# Patient Record
Sex: FEMALE | Race: BLACK OR AFRICAN AMERICAN | NOT HISPANIC OR LATINO | Employment: FULL TIME | URBAN - METROPOLITAN AREA
[De-identification: names, ages, dates, MRNs, and addresses within clinical notes are randomized per-mention and may not be internally consistent; named-entity substitution may affect disease eponyms.]

---

## 2024-01-10 ENCOUNTER — HOSPITAL ENCOUNTER (EMERGENCY)
Facility: HOSPITAL | Age: 24
Discharge: HOME | End: 2024-01-10
Attending: STUDENT IN AN ORGANIZED HEALTH CARE EDUCATION/TRAINING PROGRAM
Payer: COMMERCIAL

## 2024-01-10 VITALS
TEMPERATURE: 98 F | WEIGHT: 140 LBS | OXYGEN SATURATION: 100 % | RESPIRATION RATE: 19 BRPM | SYSTOLIC BLOOD PRESSURE: 125 MMHG | HEIGHT: 67 IN | DIASTOLIC BLOOD PRESSURE: 90 MMHG | HEART RATE: 94 BPM | BODY MASS INDEX: 21.97 KG/M2

## 2024-01-10 DIAGNOSIS — T78.40XA ALLERGIC REACTION, INITIAL ENCOUNTER: Primary | ICD-10-CM

## 2024-01-10 DIAGNOSIS — L30.9 DERMATITIS: ICD-10-CM

## 2024-01-10 LAB — B-HCG SERPL QL: NEGATIVE

## 2024-01-10 PROCEDURE — 81025 URINE PREGNANCY TEST: CPT | Performed by: NURSE PRACTITIONER

## 2024-01-10 PROCEDURE — 96372 THER/PROPH/DIAG INJ SC/IM: CPT | Performed by: NURSE PRACTITIONER

## 2024-01-10 PROCEDURE — 63600175 PHARM REV CODE 636 W HCPCS: Performed by: NURSE PRACTITIONER

## 2024-01-10 PROCEDURE — 99284 EMERGENCY DEPT VISIT MOD MDM: CPT

## 2024-01-10 PROCEDURE — 25000003 PHARM REV CODE 250: Performed by: NURSE PRACTITIONER

## 2024-01-10 RX ORDER — DEXAMETHASONE SODIUM PHOSPHATE 4 MG/ML
8 INJECTION, SOLUTION INTRA-ARTICULAR; INTRALESIONAL; INTRAMUSCULAR; INTRAVENOUS; SOFT TISSUE
Status: COMPLETED | OUTPATIENT
Start: 2024-01-10 | End: 2024-01-10

## 2024-01-10 RX ORDER — FAMOTIDINE 20 MG/1
20 TABLET, FILM COATED ORAL 2 TIMES DAILY
Qty: 20 TABLET | Refills: 0 | Status: SHIPPED | OUTPATIENT
Start: 2024-01-10 | End: 2024-01-20

## 2024-01-10 RX ORDER — TRIAMCINOLONE ACETONIDE 1 MG/G
CREAM TOPICAL 2 TIMES DAILY
Qty: 15 G | Refills: 1 | Status: SHIPPED | OUTPATIENT
Start: 2024-01-10 | End: 2024-01-20

## 2024-01-10 RX ORDER — DIPHENHYDRAMINE HYDROCHLORIDE 50 MG/ML
25 INJECTION, SOLUTION INTRAMUSCULAR; INTRAVENOUS
Status: COMPLETED | OUTPATIENT
Start: 2024-01-10 | End: 2024-01-10

## 2024-01-10 RX ORDER — FAMOTIDINE 20 MG/1
20 TABLET, FILM COATED ORAL
Status: COMPLETED | OUTPATIENT
Start: 2024-01-10 | End: 2024-01-10

## 2024-01-10 RX ORDER — PREDNISONE 10 MG/1
10 TABLET ORAL 2 TIMES DAILY
Qty: 10 TABLET | Refills: 0 | Status: SHIPPED | OUTPATIENT
Start: 2024-01-10 | End: 2024-01-15

## 2024-01-10 RX ADMIN — DEXAMETHASONE SODIUM PHOSPHATE 8 MG: 4 INJECTION, SOLUTION INTRA-ARTICULAR; INTRALESIONAL; INTRAMUSCULAR; INTRAVENOUS; SOFT TISSUE at 11:01

## 2024-01-10 RX ADMIN — FAMOTIDINE 20 MG: 20 TABLET, FILM COATED ORAL at 11:01

## 2024-01-10 RX ADMIN — DIPHENHYDRAMINE HYDROCHLORIDE 25 MG: 50 INJECTION INTRAMUSCULAR; INTRAVENOUS at 11:01

## 2024-01-10 NOTE — Clinical Note
"Celena "Janet Yao was seen and treated in our emergency department on 1/10/2024.  She may return to school on 01/15/2024.  Please excuse for up to 48hours prior for symptoms present at that time if possible. Patient may also return sooner than above date if symptoms improve/resolve.      If you have any questions or concerns, please don't hesitate to call.      Kassidy Lujan PA"

## 2024-01-10 NOTE — FIRST PROVIDER EVALUATION
Medical screening examination initiated.  I have conducted a focused provider triage encounter, findings are as follows:    Brief history of present illness:  Patient states itching and a rash after starting Amoxicillin and Clarithromycin.     There were no vitals filed for this visit.    Pertinent physical exam:  Awake, alert, ambulatory      Brief workup plan:  Medications    Preliminary workup initiated; this workup will be continued and followed by the physician or advanced practice provider that is assigned to the patient when roomed.

## 2024-01-10 NOTE — ED PROVIDER NOTES
Encounter Date: 1/10/2024       History     Chief Complaint   Patient presents with    Itching    Urticaria     Pt reports severe pruritus on entire body today. Reports itching/urticaria since taking abx (amoxiccillin and clarithomycin for h.pylori infx) last Thursday. Seen in an urgent care yesterday and was given methyl prednisone. Pt converse in full sentences, no tongue swelling, denies SOB     23-year-old  female presents to the emergency room with itching and hives diffusely spread across the body.  Patient was initially on amoxicillin and clarithromycin for approximately 6 days for H pylori prior to rash beginning.  Patient received steroid injection and steroids from local urgent care approximately 3-4 days ago.  Patient has not taken amoxicillin and clarithromycin since then.  Patient denies nausea, vomiting, fever, chills, chest pain, or difficulty breathing.  Patient denies any kind of tongue swelling or facial edema.  Patient wants to know how discontinuing this medication will affect her moving forward.    The history is provided by the patient. No  was used.     Review of patient's allergies indicates:   Allergen Reactions    Amoxicillin Hives    Clarithromycin Hives     History reviewed. No pertinent past medical history.  No past surgical history on file.  No family history on file.     Review of Systems   Constitutional:  Negative for appetite change, diaphoresis, fatigue and fever.   HENT: Negative.  Negative for facial swelling.    Eyes: Negative.    Respiratory:  Negative for cough, chest tightness and shortness of breath.    Cardiovascular: Negative.  Negative for chest pain.   Gastrointestinal: Negative.    Endocrine: Negative.    Genitourinary: Negative.    Allergic/Immunologic: Positive for environmental allergies (amoxil v clarithromycin).   Neurological: Negative.    Hematological: Negative.    Psychiatric/Behavioral: Negative.     All other systems  reviewed and are negative.      Physical Exam     Initial Vitals [01/10/24 1103]   BP Pulse Resp Temp SpO2   132/87 105 19 98.2 °F (36.8 °C) 96 %      MAP       --         Physical Exam    Vitals reviewed.  Constitutional: She appears well-developed and well-nourished. No distress.   HENT:   Head: Normocephalic and atraumatic.   Eyes: Conjunctivae, EOM and lids are normal. Pupils are equal, round, and reactive to light.   Neck: Neck supple.   Normal range of motion.  Cardiovascular:  Normal rate and regular rhythm.           Pulmonary/Chest: Effort normal and breath sounds normal.   Abdominal: Abdomen is soft and flat. Bowel sounds are normal.   Musculoskeletal:      Cervical back: Normal range of motion and neck supple.     Neurological: She is alert and oriented to person, place, and time. She has normal strength. She is not disoriented. No cranial nerve deficit or sensory deficit. GCS eye subscore is 4. GCS verbal subscore is 5. GCS motor subscore is 6.   Skin: Skin is warm and intact. Rash noted. Rash is urticarial (diffuse erythematous hives. no acute distress. no facial edema or swelling.).   Psychiatric: She has a normal mood and affect. Her speech is normal and behavior is normal. Judgment and thought content normal. Cognition and memory are normal.         ED Course   Procedures  Labs Reviewed   HCG QUALITATIVE URINE - Normal          Imaging Results    None          Medications   diphenhydrAMINE injection 25 mg (25 mg Intramuscular Given 1/10/24 1139)   famotidine tablet 20 mg (20 mg Oral Given 1/10/24 1139)   dexAMETHasone injection 8 mg (8 mg Intramuscular Given 1/10/24 1139)     Medical Decision Making  23-year-old  female presents to the emergency room with itching and hives diffusely spread across the body.  Patient was initially on amoxicillin and clarithromycin for approximately 6 days for H pylori prior to rash beginning.  Patient received steroid injection and steroids from local  urgent care approximately 3-4 days ago.  Patient has not taken amoxicillin and clarithromycin since then.  Patient denies nausea, vomiting, fever, chills, chest pain, or difficulty breathing.  Patient denies any kind of tongue swelling or facial edema.  Patient wants to know how discontinuing this medication will affect her moving forward.    Patient declined alternative treatment with Flagyl and doxycycline for H pylori.  She would like to just be treated for the rash and allergic reaction at this time.  She will follow up with her doctor in [place] for H pylori treatment and verification of H pylori eradication.    Problems Addressed:  Allergic reaction, initial encounter: acute illness or injury  Dermatitis: acute illness or injury    Risk  OTC drugs.  Prescription drug management.                                      Clinical Impression:  Final diagnoses:  [T78.40XA] Allergic reaction, initial encounter (Primary)  [L30.9] Dermatitis          ED Disposition Condition    Discharge Stable          ED Prescriptions       Medication Sig Dispense Start Date End Date Auth. Provider    predniSONE (DELTASONE) 10 MG tablet Take 1 tablet (10 mg total) by mouth 2 (two) times daily. Do not take with other oral steroid regimens. for 5 days 10 tablet 1/10/2024 1/15/2024 Kassidy Lujan PA    famotidine (PEPCID) 20 MG tablet Take 1 tablet (20 mg total) by mouth 2 (two) times daily. for 10 days 20 tablet 1/10/2024 1/20/2024 Kassidy Lujan PA    triamcinolone acetonide 0.1% (KENALOG) 0.1 % cream Apply topically 2 (two) times daily. As needed for itching for 10 days 15 g 1/10/2024 1/20/2024 Kassidy Lujan PA          Follow-up Information       Follow up With Specialties Details Why Contact Info    [facility] - Emergency Dept Emergency Medicine  As needed, If symptoms worsen [address] [address] [phone]    PCP in [place]  In 1 week for H. pylori eradicaton and  treatment alternatives              Le, FRANKLIN Richter  01/10/24 5091

## 2024-02-05 ENCOUNTER — HOSPITAL ENCOUNTER (EMERGENCY)
Facility: HOSPITAL | Age: 24
Discharge: HOME | End: 2024-02-05
Attending: STUDENT IN AN ORGANIZED HEALTH CARE EDUCATION/TRAINING PROGRAM
Payer: COMMERCIAL

## 2024-02-05 VITALS
BODY MASS INDEX: 21.97 KG/M2 | RESPIRATION RATE: 18 BRPM | WEIGHT: 140 LBS | SYSTOLIC BLOOD PRESSURE: 113 MMHG | OXYGEN SATURATION: 100 % | HEIGHT: 67 IN | HEART RATE: 88 BPM | TEMPERATURE: 98 F | DIASTOLIC BLOOD PRESSURE: 81 MMHG

## 2024-02-05 DIAGNOSIS — K64.8 INTERNAL HEMORRHOIDS: Primary | ICD-10-CM

## 2024-02-05 DIAGNOSIS — N39.0 URINARY TRACT INFECTION WITHOUT HEMATURIA, SITE UNSPECIFIED: ICD-10-CM

## 2024-02-05 LAB
ALBUMIN SERPL-MCNC: 4.1 G/DL (ref 3.5–5)
ALBUMIN/GLOB SERPL: 1.2 RATIO (ref 1.1–2)
ALP SERPL-CCNC: 55 UNIT/L (ref 40–150)
ALT SERPL-CCNC: 13 UNIT/L (ref 0–55)
APPEARANCE UR: CLEAR
AST SERPL-CCNC: 20 UNIT/L (ref 5–34)
B-HCG SERPL QL: NEGATIVE
BACTERIA #/AREA URNS AUTO: ABNORMAL /HPF
BASOPHILS # BLD AUTO: 0.03 X10(3)/MCL
BASOPHILS NFR BLD AUTO: 0.6 %
BILIRUB SERPL-MCNC: 1.2 MG/DL
BILIRUB UR QL STRIP.AUTO: NEGATIVE
BUN SERPL-MCNC: 8.2 MG/DL (ref 7–18.7)
CALCIUM SERPL-MCNC: 9.3 MG/DL (ref 8.4–10.2)
CHLORIDE SERPL-SCNC: 109 MMOL/L (ref 98–107)
CO2 SERPL-SCNC: 25 MMOL/L (ref 22–29)
COLOR UR AUTO: YELLOW
CREAT SERPL-MCNC: 0.81 MG/DL (ref 0.55–1.02)
EOSINOPHIL # BLD AUTO: 0.08 X10(3)/MCL (ref 0–0.9)
EOSINOPHIL NFR BLD AUTO: 1.7 %
ERYTHROCYTE [DISTWIDTH] IN BLOOD BY AUTOMATED COUNT: 12.6 % (ref 11.5–17)
GFR SERPLBLD CREATININE-BSD FMLA CKD-EPI: >60 MLS/MIN/1.73/M2
GLOBULIN SER-MCNC: 3.3 GM/DL (ref 2.4–3.5)
GLUCOSE SERPL-MCNC: 93 MG/DL (ref 74–100)
GLUCOSE UR QL STRIP.AUTO: NORMAL
HCT VFR BLD AUTO: 37.6 % (ref 37–47)
HGB BLD-MCNC: 12.8 G/DL (ref 12–16)
HYALINE CASTS #/AREA URNS LPF: ABNORMAL /LPF
IMM GRANULOCYTES # BLD AUTO: 0 X10(3)/MCL (ref 0–0.04)
IMM GRANULOCYTES NFR BLD AUTO: 0 %
KETONES UR QL STRIP.AUTO: NEGATIVE
LEUKOCYTE ESTERASE UR QL STRIP.AUTO: NEGATIVE
LYMPHOCYTES # BLD AUTO: 1.46 X10(3)/MCL (ref 0.6–4.6)
LYMPHOCYTES NFR BLD AUTO: 30.9 %
MCH RBC QN AUTO: 31.1 PG (ref 27–31)
MCHC RBC AUTO-ENTMCNC: 34 G/DL (ref 33–36)
MCV RBC AUTO: 91.5 FL (ref 80–94)
MONOCYTES # BLD AUTO: 0.35 X10(3)/MCL (ref 0.1–1.3)
MONOCYTES NFR BLD AUTO: 7.4 %
MUCOUS THREADS URNS QL MICRO: ABNORMAL /LPF
NEUTROPHILS # BLD AUTO: 2.8 X10(3)/MCL (ref 2.1–9.2)
NEUTROPHILS NFR BLD AUTO: 59.4 %
NITRITE UR QL STRIP.AUTO: NEGATIVE
NRBC BLD AUTO-RTO: 0 %
PH UR STRIP.AUTO: 6.5 [PH]
PLATELET # BLD AUTO: 214 X10(3)/MCL (ref 130–400)
PMV BLD AUTO: 11.1 FL (ref 7.4–10.4)
POTASSIUM SERPL-SCNC: 4.4 MMOL/L (ref 3.5–5.1)
PROT SERPL-MCNC: 7.4 GM/DL (ref 6.4–8.3)
PROT UR QL STRIP.AUTO: ABNORMAL
RBC # BLD AUTO: 4.11 X10(6)/MCL (ref 4.2–5.4)
RBC #/AREA URNS AUTO: ABNORMAL /HPF
RBC UR QL AUTO: NEGATIVE
SODIUM SERPL-SCNC: 139 MMOL/L (ref 136–145)
SP GR UR STRIP.AUTO: 1.03 (ref 1–1.03)
SQUAMOUS #/AREA URNS LPF: ABNORMAL /HPF
UROBILINOGEN UR STRIP-ACNC: NORMAL
WBC # SPEC AUTO: 4.72 X10(3)/MCL (ref 4.5–11.5)
WBC #/AREA URNS AUTO: ABNORMAL /HPF

## 2024-02-05 PROCEDURE — 85025 COMPLETE CBC W/AUTO DIFF WBC: CPT

## 2024-02-05 PROCEDURE — 81025 URINE PREGNANCY TEST: CPT

## 2024-02-05 PROCEDURE — 99284 EMERGENCY DEPT VISIT MOD MDM: CPT

## 2024-02-05 PROCEDURE — 80053 COMPREHEN METABOLIC PANEL: CPT

## 2024-02-05 PROCEDURE — 81001 URINALYSIS AUTO W/SCOPE: CPT

## 2024-02-05 RX ORDER — NITROFURANTOIN 25; 75 MG/1; MG/1
100 CAPSULE ORAL 2 TIMES DAILY
Qty: 10 CAPSULE | Refills: 0 | Status: SHIPPED | OUTPATIENT
Start: 2024-02-05 | End: 2024-02-10

## 2024-02-05 NOTE — ED PROVIDER NOTES
Encounter Date: 2/5/2024       History     Chief Complaint   Patient presents with    Rectal Bleeding     Pt presents with blood in stool. Pt states that she has rectal pain when defecating. States that she was dx with hemorrhoids 2 weeks ago. Denies abd pain. Denies n/v/d.      The patient is a 23 y.o. female  who presents to the Emergency Department with a chief complaint of blood in stool. Symptoms began 2 weeks ago and have been intermittent since onset. Patient states symptoms started 2 weeks ago after an episode of constipation. She states she was seen in urgent care where she was diagnosed with hemorrhoids. She states she was then seen by a GI clinic in [place] where she was discharged with preparation H. She reports she is still having some blood tinged stool. Her  pain is currently rated as a 0/10 in severity. Associated symptoms include nothing. Symptoms are aggravated with nothing and there are no alleviating factors. The patient denies abdominal pain, nausea, or vomiting. She reports taking nothing prior to arrival with no relief of symptoms. No other reported symptoms at this time.      The history is provided by the patient. No  was used.   Rectal Bleeding   The current episode started several weeks ago. The onset was gradual. The problem occurs occasionally. The problem has been unchanged. The pain is at a severity of 0/10. The stool is described as streaked with blood. There was no prior successful therapy. There was no prior unsuccessful therapy. Associated symptoms include hemorrhoids. Pertinent negatives include no anorexia, no fever, no abdominal pain, no diarrhea, no hematemesis, no nausea, no rectal pain, no vomiting, no hematuria, no vaginal bleeding, no vaginal discharge, no chest pain, no headaches, no coughing, no difficulty breathing and no rash. Recently, medical care has been given by a specialist and at another facility. Services received include medications given  and tests performed.     Review of patient's allergies indicates:   Allergen Reactions    Amoxicillin Hives    Clarithromycin Hives     History reviewed. No pertinent past medical history.  History reviewed. No pertinent surgical history.  History reviewed. No pertinent family history.  Social History     Tobacco Use    Smoking status: Never    Smokeless tobacco: Never     Review of Systems   Constitutional:  Negative for fever.   HENT:  Negative for sore throat.    Respiratory:  Negative for cough and shortness of breath.    Cardiovascular:  Negative for chest pain.   Gastrointestinal:  Positive for blood in stool, hematochezia and hemorrhoids. Negative for abdominal pain, anorexia, diarrhea, hematemesis, nausea, rectal pain and vomiting.   Genitourinary:  Negative for dysuria, hematuria, vaginal bleeding and vaginal discharge.   Musculoskeletal:  Negative for back pain.   Skin:  Negative for rash.   Neurological:  Negative for weakness and headaches.   Hematological:  Does not bruise/bleed easily.   All other systems reviewed and are negative.      Physical Exam     Initial Vitals [02/05/24 1124]   BP Pulse Resp Temp SpO2   (!) 142/83 104 18 98.3 °F (36.8 °C) 100 %      MAP       --         Physical Exam    Nursing note and vitals reviewed.  Constitutional: She appears well-developed and well-nourished.   HENT:   Head: Normocephalic.   Right Ear: Hearing and tympanic membrane normal.   Left Ear: Hearing and tympanic membrane normal.   Mouth/Throat: Uvula is midline, oropharynx is clear and moist and mucous membranes are normal.   Eyes: Conjunctivae and EOM are normal. Pupils are equal, round, and reactive to light.   Cardiovascular:  Normal rate, regular rhythm, normal heart sounds and normal pulses.           Pulmonary/Chest: Effort normal and breath sounds normal.   Abdominal: Abdomen is soft. Bowel sounds are normal. There is no abdominal tenderness.     Lymphadenopathy:     She has no cervical adenopathy.    Neurological: She is alert. GCS eye subscore is 4. GCS verbal subscore is 5. GCS motor subscore is 6.   Skin: Skin is warm, dry and intact. Capillary refill takes less than 2 seconds.         ED Course   Procedures  Labs Reviewed   COMPREHENSIVE METABOLIC PANEL - Abnormal; Notable for the following components:       Result Value    Chloride 109 (*)     All other components within normal limits   URINALYSIS, REFLEX TO URINE CULTURE - Abnormal; Notable for the following components:    Protein, UA 1+ (*)     WBC, UA 6-10 (*)     Bacteria, UA Many (*)     Mucous, UA Few (*)     Hyaline Casts, UA 0-2 (*)     All other components within normal limits   CBC WITH DIFFERENTIAL - Abnormal; Notable for the following components:    RBC 4.11 (*)     MCH 31.1 (*)     MPV 11.1 (*)     All other components within normal limits   PREGNANCY TEST, URINE RAPID - Normal   CBC W/ AUTO DIFFERENTIAL    Narrative:     The following orders were created for panel order CBC auto differential.  Procedure                               Abnormality         Status                     ---------                               -----------         ------                     CBC with Differential[[id number]]       Abnormal            Final result                 Please view results for these tests on the individual orders.          Imaging Results    None          Medications - No data to display  Medical Decision Making  The patient is a 23 y.o. female  who presents to the Emergency Department with a chief complaint of blood in stool. Symptoms began 2 weeks ago and have been intermittent since onset. Patient states symptoms started 2 weeks ago after an episode of constipation. She states she was seen in urgent care where she was diagnosed with hemorrhoids. She states she was then seen by a GI clinic in [place] where she was discharged with preparation H. She reports she is still having some blood tinged stool. Her  pain is currently rated as a 0/10 in  severity. Associated symptoms include nothing. Symptoms are aggravated with nothing and there are no alleviating factors. The patient denies abdominal pain, nausea, or vomiting. She reports taking nothing prior to arrival with no relief of symptoms. No other reported symptoms at this time.      Differential diagnosis include but are not limited to hemorrhoids, anal fissue    Problems Addressed:  Internal hemorrhoids: acute illness or injury  Urinary tract infection without hematuria, site unspecified: acute illness or injury    Amount and/or Complexity of Data Reviewed  External Data Reviewed: labs and notes.  Labs: ordered. Decision-making details documented in ED Course.    Risk  Prescription drug management.               ED Course as of 02/05/24 1411   Mon Feb 05, 2024   1232 Hemoglobin: 12.8 [LM]   1232 Hematocrit: 37.6 [LM]   1242 hCG Qualitative, Urine: Negative [LM]   1310 Bacteria, UA(!): Many [LM]   1310 WBC, UA(!): 6-10 [LM]   1319 I discussed results in detail with patient including follow up. She is amendable to plan and ready for discharge home. Patient has no abdominal pain, nausea, or vomiting. She was given strict ER return precautions if she develops these symptoms.  [LM]      ED Course User Index  [LM] Antonieta Michel NP                           Clinical Impression:  Final diagnoses:  [K64.8] Internal hemorrhoids (Primary)  [N39.0] Urinary tract infection without hematuria, site unspecified          ED Disposition Condition    Discharge Stable          ED Prescriptions       Medication Sig Dispense Start Date End Date Auth. Provider    hydrocortisone-pramoxine (PROCTOFOAM-HS) rectal foam Place 1 applicator rectally 2 (two) times daily. for 7 days 14 applicator 2/5/2024 2/12/2024 Antonieta Michel NP    nitrofurantoin, macrocrystal-monohydrate, (MACROBID) 100 MG capsule Take 1 capsule (100 mg total) by mouth 2 (two) times daily. for 5 days 10 capsule 2/5/2024 2/10/2024 Antonieta Michel NP           Follow-up Information       Follow up With Specialties Details Why Contact Info    [facility] - Colon & Rectal Surgery Colon and Rectal Surgery Schedule an appointment as soon as possible for a visit   Critical access hospital[address] [facility] [address]             Antonieta Michel, TICO  02/05/24 1600